# Patient Record
Sex: FEMALE | Race: WHITE | Employment: OTHER | URBAN - METROPOLITAN AREA
[De-identification: names, ages, dates, MRNs, and addresses within clinical notes are randomized per-mention and may not be internally consistent; named-entity substitution may affect disease eponyms.]

---

## 2019-10-14 ENCOUNTER — HOSPITAL ENCOUNTER (OUTPATIENT)
Dept: GENERAL RADIOLOGY | Age: 69
Discharge: HOME | End: 2019-10-14
Attending: INTERNAL MEDICINE
Payer: MEDICARE

## 2019-10-14 DIAGNOSIS — M19.90 OSTEOARTHRITIS: ICD-10-CM

## 2019-10-14 PROCEDURE — 72052 X-RAY EXAM NECK SPINE 6/>VWS: CPT

## 2022-05-02 ENCOUNTER — TRANSCRIBE ORDER (OUTPATIENT)
Dept: GENERAL RADIOLOGY | Age: 72
End: 2022-05-02

## 2022-05-02 ENCOUNTER — HOSPITAL ENCOUNTER (OUTPATIENT)
Dept: GENERAL RADIOLOGY | Age: 72
Discharge: HOME | End: 2022-05-02
Attending: INTERNAL MEDICINE
Payer: MEDICARE

## 2022-05-02 DIAGNOSIS — R07.9 CHEST PAIN, UNSPECIFIED: Primary | ICD-10-CM

## 2022-05-02 DIAGNOSIS — R07.9 CHEST PAIN, UNSPECIFIED: ICD-10-CM

## 2022-05-02 PROCEDURE — 71046 X-RAY EXAM CHEST 2 VIEWS: CPT

## 2022-10-18 ENCOUNTER — TRANSCRIBE ORDER (OUTPATIENT)
Dept: SCHEDULING | Age: 72
End: 2022-10-18

## 2022-10-18 DIAGNOSIS — R07.9 CHEST PAIN, UNSPECIFIED TYPE: Primary | ICD-10-CM

## 2022-10-19 ENCOUNTER — HOSPITAL ENCOUNTER (OUTPATIENT)
Dept: CT IMAGING | Age: 72
Discharge: HOME | End: 2022-10-19
Attending: INTERNAL MEDICINE
Payer: MEDICARE

## 2022-10-19 DIAGNOSIS — R07.9 CHEST PAIN, UNSPECIFIED TYPE: ICD-10-CM

## 2022-10-19 PROCEDURE — 74011000636 HC RX REV CODE- 636: Performed by: INTERNAL MEDICINE

## 2022-10-19 PROCEDURE — 71260 CT THORAX DX C+: CPT

## 2022-10-19 RX ADMIN — IOPAMIDOL 100 ML: 612 INJECTION, SOLUTION INTRAVENOUS at 09:42

## 2022-10-20 ENCOUNTER — OFFICE VISIT (OUTPATIENT)
Dept: ORTHOPEDIC SURGERY | Age: 72
End: 2022-10-20
Payer: MEDICARE

## 2022-10-20 VITALS — HEIGHT: 63 IN | BODY MASS INDEX: 19.84 KG/M2 | WEIGHT: 112 LBS

## 2022-10-20 DIAGNOSIS — G56.02 CARPAL TUNNEL SYNDROME ON LEFT: ICD-10-CM

## 2022-10-20 DIAGNOSIS — M79.642 LEFT HAND PAIN: Primary | ICD-10-CM

## 2022-10-20 PROCEDURE — L3908 WHO COCK-UP NONMOLDE PRE OTS: HCPCS | Performed by: ORTHOPAEDIC SURGERY

## 2022-10-20 PROCEDURE — 99203 OFFICE O/P NEW LOW 30 MIN: CPT | Performed by: ORTHOPAEDIC SURGERY

## 2022-10-20 PROCEDURE — 20526 THER INJECTION CARP TUNNEL: CPT | Performed by: ORTHOPAEDIC SURGERY

## 2022-10-20 PROCEDURE — 1123F ACP DISCUSS/DSCN MKR DOCD: CPT | Performed by: ORTHOPAEDIC SURGERY

## 2022-10-20 RX ORDER — ROSUVASTATIN CALCIUM 20 MG/1
TABLET, COATED ORAL
COMMUNITY
Start: 2022-09-02

## 2022-10-20 RX ORDER — URSODIOL 250 MG/1
TABLET, FILM COATED ORAL
COMMUNITY

## 2022-10-20 RX ORDER — TRAZODONE HYDROCHLORIDE 50 MG/1
TABLET ORAL
COMMUNITY
Start: 2022-09-21

## 2022-10-20 RX ORDER — HYDROCORTISONE 25 MG/G
CREAM TOPICAL
COMMUNITY
Start: 2022-07-29

## 2022-10-20 RX ORDER — BETAMETHASONE SODIUM PHOSPHATE AND BETAMETHASONE ACETATE 3; 3 MG/ML; MG/ML
9 INJECTION, SUSPENSION INTRA-ARTICULAR; INTRALESIONAL; INTRAMUSCULAR; SOFT TISSUE ONCE
Status: DISCONTINUED | OUTPATIENT
Start: 2022-10-20 | End: 2022-10-20

## 2022-10-20 RX ORDER — BETAMETHASONE SODIUM PHOSPHATE AND BETAMETHASONE ACETATE 3; 3 MG/ML; MG/ML
6 INJECTION, SUSPENSION INTRA-ARTICULAR; INTRALESIONAL; INTRAMUSCULAR; SOFT TISSUE ONCE
Status: COMPLETED | OUTPATIENT
Start: 2022-10-20 | End: 2022-10-20

## 2022-10-20 RX ORDER — ESTRADIOL 0.03 MG/D
PATCH TRANSDERMAL
COMMUNITY
Start: 2022-09-15

## 2022-10-20 RX ORDER — SERTRALINE HYDROCHLORIDE 100 MG/1
TABLET, FILM COATED ORAL
COMMUNITY
Start: 2022-08-08

## 2022-10-20 RX ORDER — BUSPIRONE HYDROCHLORIDE 15 MG/1
TABLET ORAL
COMMUNITY
Start: 2022-09-13

## 2022-10-20 RX ORDER — BUPIVACAINE HYDROCHLORIDE 5 MG/ML
1.5 INJECTION, SOLUTION EPIDURAL; INTRACAUDAL ONCE
Status: COMPLETED | OUTPATIENT
Start: 2022-10-20 | End: 2022-10-20

## 2022-10-20 RX ORDER — RALOXIFENE HYDROCHLORIDE 60 MG/1
TABLET, FILM COATED ORAL
COMMUNITY

## 2022-10-20 RX ORDER — BUPIVACAINE HYDROCHLORIDE 5 MG/ML
1.5 INJECTION, SOLUTION EPIDURAL; INTRACAUDAL ONCE
Status: DISCONTINUED | OUTPATIENT
Start: 2022-10-20 | End: 2022-10-20

## 2022-10-20 RX ADMIN — BUPIVACAINE HYDROCHLORIDE 7.5 MG: 5 INJECTION, SOLUTION EPIDURAL; INTRACAUDAL at 14:31

## 2022-10-20 RX ADMIN — BETAMETHASONE SODIUM PHOSPHATE AND BETAMETHASONE ACETATE 6 MG: 3; 3 INJECTION, SUSPENSION INTRA-ARTICULAR; INTRALESIONAL; INTRAMUSCULAR; SOFT TISSUE at 14:31

## 2022-10-20 NOTE — PROGRESS NOTES
Ciarra Cortez (: 1950) is a 81 y.o. female patient here for evaluation of the following chief complaint(s):  Hand Pain (Left hand numbness )       ASSESSMENT/PLAN:  Below is the assessment and plan developed based on review of pertinent history, physical exam, labs, studies, and medications. 1. Left hand pain  -     XR HAND LT MIN 3 V; Future  -     REFERRAL TO [facility] [place]  -     WRIST COCK-UP NON-MOLDED  2. Carpal tunnel syndrome on left  -     betamethasone (CELESTONE) injection 9 mg; 9 mg, IntraLESional, ONCE, 1 dose, On u 10/20/22 at 1800  -     bupivacaine (PF) (MARCAINE) 0.5 % (5 mg/mL) injection 7.5 mg; 7.5 mg (1.5 mL), Other, ONCE, 1 dose, On u 10/20/22 at 1800  -     [address]      81year-old female with left carpal tunnel syndrome. She will start with a wrist brace and injection which she tolerated well. Follow-up as needed depending on how she responds to the injection would guide further treatment. Patient verbalized understanding and elected to proceed. All questions were answered to the patient's apparent satisfaction. SUBJECTIVE/OBJECTIVE:  HPI    68-year-old female with left hand stiffness, pain, numbness and tingling. Been on and off for years but getting very painful over the last 2 weeks. Waking her up at night. Goes numb with driving on the phone. Has only tried Aleve once and did not really help. Wakes her up from sleep. Patient reports a gradual onset of symptoms. Duration of problem years, worse last 2.   Symptom Severity 7/10  Symptom Frequency constant        Allergies   Allergen Reactions    Penicillins Itching    Shellfish Derived Hives and Itching       Current Outpatient Medications   Medication Sig    estradioL (CLIMARA) 0.025 mg/24 hr     busPIRone (BUSPAR) 15 mg tablet     hydrocortisone (ANUSOL-HC) 2.5 % rectal cream     rosuvastatin (CRESTOR) 20 mg tablet     sertraline (ZOLOFT) 100 mg tablet     traZODone (DESYREL) 50 mg tablet     ursodioL (ACTIGALL) 250 mg tablet ursodiol 250 mg tablet   GIVE 1/4 TABLET BY MOUTH EVERY DAY    raloxifene (EVISTA) 60 mg tablet raloxifene 60 mg tablet    levomefolate calcium (L-METHYLFOLATE PO) Take  by mouth. Current Facility-Administered Medications   Medication    betamethasone (CELESTONE) injection 9 mg    bupivacaine (PF) (MARCAINE) 0.5 % (5 mg/mL) injection 7.5 mg       Social History     Socioeconomic History    Marital status:      Spouse name: Not on file    Number of children: Not on file    Years of education: Not on file    Highest education level: Not on file   Occupational History    Not on file   Tobacco Use    Smoking status: Never     Passive exposure: Never    Smokeless tobacco: Never   Vaping Use    Vaping Use: Never used   Substance and Sexual Activity    Alcohol use: Yes     Comment: moderately    Drug use: Never    Sexual activity: Not Currently     Partners: Male   Other Topics Concern    Not on file   Social History Narrative    Not on file     Social Determinants of Health     Financial Resource Strain: Not on file   Food Insecurity: Not on file   Transportation Needs: Not on file   Physical Activity: Not on file   Stress: Not on file   Social Connections: Not on file   Intimate Partner Violence: Not on file   Housing Stability: Not on file       Past Surgical History:   Procedure Laterality Date    HX BREAST LUMPECTOMY Bilateral     HX HYSTERECTOMY      HX OTHER SURGICAL Left     Baker's cyst       History reviewed. No pertinent family history. Review of Systems    No flowsheet data found. Vitals:  Ht 5' 3\" (1.6 m)   Wt 112 lb (50.8 kg)   BMI 19.84 kg/m²    Estimated body surface area is 1.5 meters squared as calculated from the following:    Height as of this encounter: 5' 3\" (1.6 m). Weight as of this encounter: 112 lb (50.8 kg). Body mass index is 19.84 kg/m².        Physical Exam    Musculoskeletal Exam:    Left NEURO EXAM:    Phalen's: Positive    MNCT: Positive    Thenar Atrophy: none    Tinel's MN: Positive    APB STRENGTH: 4/5    1st DI Strength: 5/5      Elbow Flexion Test: Negative    UNCT Elbow: Negative    UNCT Palm: Negative    Ring/Small Clawing: Negative    Tinel's UN: Negative      Patient fires AIN, PIN and ulnar nerves. Sensation is grossly intact in the median, radial and ulnar distribution. Hand is pink and appears well-perfused. Hand is warm. Skin is intact. Compartments are soft and compressible. Consitutional: Healthy  Skin:   - Edema - none  - Cellulitis - No    Neuro: Numbness or tingling in R/L arm: Yes    Psych: Affect normal    Cardiovascular: Capillary Refill < 2 seconds in upper extremities    Respiratory: Non-Labored Breathing    ROS:    Constitutional: Denies fever/chills    Respiratory: Denies SOB        Imaging:    XR Results (most recent):  Results from Appointment encounter on 10/20/22    XR HAND LT MIN 3 V    Narrative  Left Hand Xray  Indication: pain  Views: 3 views, AP/LAT/OBL    Interpretation: 3 views of the left hand are reviewed and show no acute osseous abnormalities. There is a benign-appearing cyst within the scaphoid. Mild thumb CMC arthritis and subluxation. There are no fractures or dislocations noted. The carpal alignment is well-maintained with no obvious abnormalities noted. There is no evidence indicating obvious ligament tear noted on these x-rays. Normal bone architecture is noted. No evidence of soft tissue swelling is noted.           Orders Placed This Encounter    INJECT CARPAL TUNNEL    XR HAND LT MIN 3 V     Long hand     Standing Status:   Future     Number of Occurrences:   1     Standing Expiration Date:   10/21/2023    [facility]– [place] - REFERRAL TO DME [[id number]]     Referral Priority:   Routine     Referral Type:   Consultation     Referral Reason:   Specialty Services Required     Referred to Provider:   Brenden Bergman MD     Number of Visits Requested:   1    WRIST BRACE 8\"     Left wrist brace    betamethasone (CELESTONE) injection 6 mg    bupivacaine (PF) (MARCAINE) 0.5 % (5 mg/mL) injection 7.5 mg    betamethasone (CELESTONE) injection 9 mg    bupivacaine (PF) (MARCAINE) 0.5 % (5 mg/mL) injection 7.5 mg        Procedures:    Carpal Tunnel Injection:    After explaining the risks and benefits of the procedure and obtaining informed consent from the patient, the proposed area for injection was confirmed with the patient. After all questions and concerns were addressed, the skin was prepped with alcohol to reduce the chances of infection. The skin was anesthetized with topical ethylene chloride spray. Next, the left carpal tunnel was injected with 1.5 cc of of 0.5% bupivacaine and 1.5 cc of Betamethasone (6mg/mL). Patient tolerated the procedure well without any complications. The needle was removed and disposed of in a sterile container. The patient tolerated the injection well and a band-aid was placed on the skin. The patient was counseled on protecting the injection area, icing for pain relief and looking for signs and symptoms of infection. We requested that the patient contact us if any symptoms persist greater than 48 hours after the injection. An electronic signature was used to authenticate this note.   -- Sulaiman Martins MD

## 2023-03-07 ENCOUNTER — OFFICE VISIT (OUTPATIENT)
Dept: ORTHOPEDIC SURGERY | Age: 73
End: 2023-03-07
Payer: MEDICARE

## 2023-03-07 VITALS — WEIGHT: 115 LBS | BODY MASS INDEX: 20.38 KG/M2 | HEIGHT: 63 IN

## 2023-03-07 DIAGNOSIS — G56.02 CARPAL TUNNEL SYNDROME ON LEFT: Primary | ICD-10-CM

## 2023-03-07 PROCEDURE — 1090F PRES/ABSN URINE INCON ASSESS: CPT | Performed by: ORTHOPAEDIC SURGERY

## 2023-03-07 PROCEDURE — G8432 DEP SCR NOT DOC, RNG: HCPCS | Performed by: ORTHOPAEDIC SURGERY

## 2023-03-07 PROCEDURE — G8420 CALC BMI NORM PARAMETERS: HCPCS | Performed by: ORTHOPAEDIC SURGERY

## 2023-03-07 PROCEDURE — 3017F COLORECTAL CA SCREEN DOC REV: CPT | Performed by: ORTHOPAEDIC SURGERY

## 2023-03-07 PROCEDURE — G8427 DOCREV CUR MEDS BY ELIG CLIN: HCPCS | Performed by: ORTHOPAEDIC SURGERY

## 2023-03-07 PROCEDURE — G8400 PT W/DXA NO RESULTS DOC: HCPCS | Performed by: ORTHOPAEDIC SURGERY

## 2023-03-07 PROCEDURE — G8536 NO DOC ELDER MAL SCRN: HCPCS | Performed by: ORTHOPAEDIC SURGERY

## 2023-03-07 PROCEDURE — 1123F ACP DISCUSS/DSCN MKR DOCD: CPT | Performed by: ORTHOPAEDIC SURGERY

## 2023-03-07 PROCEDURE — 1101F PT FALLS ASSESS-DOCD LE1/YR: CPT | Performed by: ORTHOPAEDIC SURGERY

## 2023-03-07 PROCEDURE — 99214 OFFICE O/P EST MOD 30 MIN: CPT | Performed by: ORTHOPAEDIC SURGERY

## 2023-03-07 NOTE — PROGRESS NOTES
Ciarra Cortez (: 1950) is a 72 y.o. female patient here for evaluation of the following chief complaint(s):  Hand Pain (Left carpal tunnel )       ASSESSMENT/PLAN:  Below is the assessment and plan developed based on review of pertinent history, physical exam, labs, studies, and medications. 1. Carpal tunnel syndrome on left      68-year-old female with left carpal tunnel syndrome. She had great results from the injection and now wants to proceed with surgery. We also discussed repeat injection so she had the opportunity to weigh different options. We discussed risks, benefits and alternatives to surgery. After thorough discussion ultimately the patient elected to proceed with operative intervention. We discussed the risks including but not limited to postoperative pain, swelling, bruising, bleeding, scarring, infection, damage to neurovascular structures. Risk of permanent or temporary loss of range of motion, need for additional surgery, rejection of foreign material such as metal, suture or other tissue. Plan is for left carpal tunnel release. Patient verbalized understanding and elected to proceed. All questions were answered to the patient's apparent satisfaction. SUBJECTIVE/OBJECTIVE:  HPI    68-year-old female following up on her left carpal tunnel syndrome. She had an injection last time and helped significantly. It is worn off now and symptoms are getting worse. Patient reports a gradual onset of symptoms. Duration of problem years, worse last 2.   Symptom Severity 8/10  Symptom Frequency constant        Allergies   Allergen Reactions    Penicillins Itching    Shellfish Derived Hives and Itching       Current Outpatient Medications   Medication Sig    estradioL (CLIMARA) 0.025 mg/24 hr     busPIRone (BUSPAR) 15 mg tablet     hydrocortisone (ANUSOL-HC) 2.5 % rectal cream     rosuvastatin (CRESTOR) 20 mg tablet     sertraline (ZOLOFT) 100 mg tablet     traZODone (DESYREL) 50 mg tablet     ursodioL (ACTIGALL) 250 mg tablet ursodiol 250 mg tablet   GIVE 1/4 TABLET BY MOUTH EVERY DAY    raloxifene (EVISTA) 60 mg tablet raloxifene 60 mg tablet    levomefolate calcium (L-METHYLFOLATE PO) Take  by mouth. No current facility-administered medications for this visit. Social History     Socioeconomic History    Marital status:      Spouse name: Not on file    Number of children: Not on file    Years of education: Not on file    Highest education level: Not on file   Occupational History    Not on file   Tobacco Use    Smoking status: Never     Passive exposure: Never    Smokeless tobacco: Never   Vaping Use    Vaping Use: Never used   Substance and Sexual Activity    Alcohol use: Yes     Comment: moderately    Drug use: Never    Sexual activity: Not Currently     Partners: Male   Other Topics Concern    Not on file   Social History Narrative    Not on file     Social Determinants of Health     Financial Resource Strain: Not on file   Food Insecurity: Not on file   Transportation Needs: Not on file   Physical Activity: Not on file   Stress: Not on file   Social Connections: Not on file   Intimate Partner Violence: Not on file   Housing Stability: Not on file       Past Surgical History:   Procedure Laterality Date    HX BREAST LUMPECTOMY Bilateral     HX HYSTERECTOMY      HX OTHER SURGICAL Left     Baker's cyst       History reviewed. No pertinent family history. Review of Systems    No flowsheet data found. Vitals:  Ht 5' 3\" (1.6 m)   Wt 115 lb (52.2 kg)   BMI 20.37 kg/m²    Estimated body surface area is 1.52 meters squared as calculated from the following:    Height as of this encounter: 5' 3\" (1.6 m). Weight as of this encounter: 115 lb (52.2 kg). Body mass index is 20.37 kg/m².        Physical Exam    Musculoskeletal Exam:    Left NEURO EXAM:    Phalen's: Positive    MNCT: Positive    Thenar Atrophy: none    Tinel's MN: Positive    APB STRENGTH: 4/5    1st DI Strength: 5/5      Elbow Flexion Test: Negative    UNCT Elbow: Negative    UNCT Palm: Negative    Ring/Small Clawing: Negative    Tinel's UN: Negative      Patient fires AIN, PIN and ulnar nerves. Sensation is grossly intact in the median, radial and ulnar distribution. Hand is pink and appears well-perfused. Hand is warm. Skin is intact. Compartments are soft and compressible. Consitutional: Healthy  Skin:   - Edema - none  - Cellulitis - No    Neuro: Numbness or tingling in R/L arm: Yes    Psych: Affect normal    Cardiovascular: Capillary Refill < 2 seconds in upper extremities    Respiratory: Non-Labored Breathing    ROS:    Constitutional: Denies fever/chills    Respiratory: Denies SOB        Imaging:    XR Results (most recent):  Results from Appointment encounter on 10/20/22    XR HAND LT MIN 3 V    Narrative  Left Hand Xray  Indication: pain  Views: 3 views, AP/LAT/OBL    Interpretation: 3 views of the left hand are reviewed and show no acute osseous abnormalities. There is a benign-appearing cyst within the scaphoid. Mild thumb CMC arthritis and subluxation. There are no fractures or dislocations noted. The carpal alignment is well-maintained with no obvious abnormalities noted. There is no evidence indicating obvious ligament tear noted on these x-rays. Normal bone architecture is noted. No evidence of soft tissue swelling is noted. No orders of the defined types were placed in this encounter. An electronic signature was used to authenticate this note.   -- Zac Mcrae MD

## 2023-03-09 DIAGNOSIS — G56.02 CARPAL TUNNEL SYNDROME ON LEFT: Primary | ICD-10-CM

## 2023-03-15 DIAGNOSIS — G56.02 CARPAL TUNNEL SYNDROME ON LEFT: Primary | ICD-10-CM

## 2023-03-15 RX ORDER — TRAMADOL HYDROCHLORIDE 50 MG/1
50 TABLET ORAL
Qty: 15 TABLET | Refills: 0 | Status: SHIPPED | OUTPATIENT
Start: 2023-03-15 | End: 2023-03-22

## 2023-03-22 ENCOUNTER — OFFICE VISIT (OUTPATIENT)
Dept: ORTHOPEDIC SURGERY | Age: 73
End: 2023-03-22

## 2023-03-22 VITALS — BODY MASS INDEX: 20.38 KG/M2 | WEIGHT: 115 LBS | HEIGHT: 63 IN

## 2023-03-22 DIAGNOSIS — G56.02 CARPAL TUNNEL SYNDROME ON LEFT: Primary | ICD-10-CM

## 2023-03-22 NOTE — PROGRESS NOTES
Ciarra Cortez (: 1950) is a 72 y.o. female patient here for evaluation of the following chief complaint(s):  Hand Pain (Left carpal tunnel release )       ASSESSMENT/PLAN:  Below is the assessment and plan developed based on review of pertinent history, physical exam, labs, studies, and medications. 1. Carpal tunnel syndrome on left      Patient is overall doing very well so far. Discussed increasing activity and range of motion exercises. Discussed wound care. Follow-up in 2 months for final check. She does report the right side is starting to bother her a little bit and recommended a wrist brace for that. If it begins to worsen could consider additional treatment or work-up for that side. Patient verbalized understanding and elected to proceed. All questions were answered to the patient's apparent satisfaction. SUBJECTIVE/OBJECTIVE:    Patient is here today for a postoperative visit. Date of Surgery: 3/15/23    Patient is now 1 week out from left carpal tunnel release and reports she is doing very well. Pain is improved significantly and night symptoms are gone. She does have some residual numbness in the middle finger but overall improved. Allergies   Allergen Reactions    Penicillins Itching    Shellfish Derived Hives and Itching       Current Outpatient Medications   Medication Sig    traMADoL (ULTRAM) 50 mg tablet Take 1 Tablet by mouth every six (6) hours as needed for Pain for up to 7 days. Max Daily Amount: 200 mg.    estradioL (CLIMARA) 0.025 mg/24 hr     busPIRone (BUSPAR) 15 mg tablet     hydrocortisone (ANUSOL-HC) 2.5 % rectal cream     rosuvastatin (CRESTOR) 20 mg tablet     sertraline (ZOLOFT) 100 mg tablet     traZODone (DESYREL) 50 mg tablet     ursodioL (ACTIGALL) 250 mg tablet ursodiol 250 mg tablet   GIVE 1/4 TABLET BY MOUTH EVERY DAY    raloxifene (EVISTA) 60 mg tablet raloxifene 60 mg tablet    levomefolate calcium (L-METHYLFOLATE PO) Take  by mouth. No current facility-administered medications for this visit. Social History     Socioeconomic History    Marital status:      Spouse name: Not on file    Number of children: Not on file    Years of education: Not on file    Highest education level: Not on file   Occupational History    Not on file   Tobacco Use    Smoking status: Never     Passive exposure: Never    Smokeless tobacco: Never   Vaping Use    Vaping Use: Never used   Substance and Sexual Activity    Alcohol use: Yes     Comment: moderately    Drug use: Never    Sexual activity: Not Currently     Partners: Male   Other Topics Concern    Not on file   Social History Narrative    Not on file     Social Determinants of Health     Financial Resource Strain: Not on file   Food Insecurity: Not on file   Transportation Needs: Not on file   Physical Activity: Not on file   Stress: Not on file   Social Connections: Not on file   Intimate Partner Violence: Not on file   Housing Stability: Not on file       Past Surgical History:   Procedure Laterality Date    HX BREAST LUMPECTOMY Bilateral     HX HYSTERECTOMY      HX OTHER SURGICAL Left     Baker's cyst       History reviewed. No pertinent family history. Review of Systems    No flowsheet data found. Vitals:  Ht 5' 3\" (1.6 m)   Wt 115 lb (52.2 kg)   BMI 20.37 kg/m²    Estimated body surface area is 1.52 meters squared as calculated from the following:    Height as of this encounter: 5' 3\" (1.6 m). Weight as of this encounter: 115 lb (52.2 kg). Body mass index is 20.37 kg/m². Physical Exam    Musculoskeletal Exam:    Left upper Extremity Exam:    Evaluation of the patient's postoperative site shows that the incision is intact and healing appropriately. There are no signs of infection, no redness, no warmth, no erythema. There is no purulent drainage noted. Patient fires AIN, PIN and ulnar nerves.   Sensation is grossly intact in the median, radial and ulnar distribution. Hand is pink and appears well-perfused. Hand is warm. Consitutional: Healthy  Skin:   - Edema - mild  - Cellulitis - No    Neuro: Numbness or tingling in R/L arm: mild    Psych: Affect normal    Cardiovascular: Capillary Refill < 2 seconds in upper extremities    Respiratory: Non-Labored Breathing      ROS:    Constitutional: Denies fever/chills    Respiratory: Denies SOB        Imaging:    XR Results (most recent):  Results from Appointment encounter on 10/20/22    XR HAND LT MIN 3 V    Narrative  Left Hand Xray  Indication: pain  Views: 3 views, AP/LAT/OBL    Interpretation: 3 views of the left hand are reviewed and show no acute osseous abnormalities. There is a benign-appearing cyst within the scaphoid. Mild thumb CMC arthritis and subluxation. There are no fractures or dislocations noted. The carpal alignment is well-maintained with no obvious abnormalities noted. There is no evidence indicating obvious ligament tear noted on these x-rays. Normal bone architecture is noted. No evidence of soft tissue swelling is noted. No orders of the defined types were placed in this encounter. Procedures:    Sutures removed today. An electronic signature was used to authenticate this note.   -- Ashley Lawler MD

## 2023-11-07 ENCOUNTER — HOSPITAL ENCOUNTER (OUTPATIENT)
Age: 73
Discharge: HOME | End: 2023-11-10
Payer: MEDICARE

## 2023-11-07 DIAGNOSIS — R93.89 ABNORMAL COMPUTERIZED AXIAL TOMOGRAPHY OF CHEST: ICD-10-CM

## 2023-11-07 PROCEDURE — 71250 CT THORAX DX C-: CPT

## 2024-11-08 ENCOUNTER — HOSPITAL ENCOUNTER (OUTPATIENT)
Age: 74
Discharge: HOME | End: 2024-11-11
Payer: MEDICARE

## 2024-11-08 DIAGNOSIS — R93.89 ABNORMAL COMPUTERIZED AXIAL TOMOGRAPHY OF CHEST: ICD-10-CM

## 2024-11-08 PROCEDURE — 71250 CT THORAX DX C-: CPT

## 2025-08-22 ENCOUNTER — TRANSCRIBE ORDERS (OUTPATIENT)
Facility: HOSPITAL | Age: 75
End: 2025-08-22

## 2025-08-22 DIAGNOSIS — R93.89 ABNORMAL COMPUTERIZED AXIAL TOMOGRAPHY OF CHEST: Primary | ICD-10-CM
